# Patient Record
Sex: MALE | Race: OTHER | Employment: FULL TIME | URBAN - METROPOLITAN AREA
[De-identification: names, ages, dates, MRNs, and addresses within clinical notes are randomized per-mention and may not be internally consistent; named-entity substitution may affect disease eponyms.]

---

## 2021-12-20 ENCOUNTER — HOSPITAL ENCOUNTER (OUTPATIENT)
Age: 46
Discharge: HOME | End: 2021-12-20
Attending: EMERGENCY MEDICINE
Payer: COMMERCIAL

## 2021-12-20 VITALS
DIASTOLIC BLOOD PRESSURE: 70 MMHG | HEART RATE: 58 BPM | SYSTOLIC BLOOD PRESSURE: 116 MMHG | TEMPERATURE: 97 F | OXYGEN SATURATION: 97 % | RESPIRATION RATE: 16 BRPM

## 2021-12-20 DIAGNOSIS — Z20.822 ENCOUNTER FOR SCREENING LABORATORY TESTING FOR COVID-19 VIRUS IN ASYMPTOMATIC PATIENT: Primary | ICD-10-CM

## 2021-12-20 PROCEDURE — 99202 OFFICE O/P NEW SF 15 MIN: CPT

## 2021-12-20 NOTE — ED PROVIDER NOTES
Patient Seen in: [facility]      History   Patient presents with:  Testing    Stated Complaint: Covid exposure, headache    Subjective:   HPI    The patient is a 54-year-old male with no significant past medical history who presents now for C focal swelling or tenderness  Skin: No pallor, no redness or warmth to the touch      ED Course     Labs Reviewed   RAPID SARS-COV-2 BY PCR - Normal          Pulse ox is 97% on room air, normal     Patient's negative Covid was discussed with the patient an

## 2021-12-28 ENCOUNTER — HOSPITAL ENCOUNTER (OUTPATIENT)
Age: 46
Discharge: HOME | End: 2021-12-28
Payer: COMMERCIAL

## 2021-12-28 VITALS
OXYGEN SATURATION: 94 % | TEMPERATURE: 100 F | SYSTOLIC BLOOD PRESSURE: 125 MMHG | HEART RATE: 80 BPM | RESPIRATION RATE: 22 BRPM | DIASTOLIC BLOOD PRESSURE: 80 MMHG

## 2021-12-28 DIAGNOSIS — J02.0 STREPTOCOCCAL SORE THROAT: Primary | ICD-10-CM

## 2021-12-28 LAB — S PYO AG THROAT QL: POSITIVE

## 2021-12-28 PROCEDURE — 87880 STREP A ASSAY W/OPTIC: CPT

## 2021-12-28 PROCEDURE — 99213 OFFICE O/P EST LOW 20 MIN: CPT

## 2021-12-28 RX ORDER — IBUPROFEN 600 MG/1
600 TABLET ORAL EVERY 8 HOURS PRN
Qty: 30 TABLET | Refills: 0 | Status: SHIPPED | OUTPATIENT
Start: 2021-12-28 | End: 2022-01-04

## 2021-12-28 RX ORDER — FLUTICASONE PROPIONATE 50 MCG
2 SPRAY, SUSPENSION (ML) NASAL DAILY
Qty: 16 G | Refills: 0 | Status: SHIPPED | OUTPATIENT
Start: 2021-12-28 | End: 2022-01-27

## 2021-12-28 RX ORDER — AMOXICILLIN 500 MG/1
500 TABLET, FILM COATED ORAL 3 TIMES DAILY
Qty: 30 TABLET | Refills: 0 | Status: SHIPPED | OUTPATIENT
Start: 2021-12-28 | End: 2022-01-07

## 2021-12-28 NOTE — ED PROVIDER NOTES
Patient Seen in: [facility]      History   Patient presents with:  Cough/URI  Fever    Stated Complaint: cough,fever [id number]    Subjective:   HPI    52 yo male with abd pain and sore throat, neck supple, tolerating secretions, normal phona respiratory distress. Breath sounds: No wheezing. Abdominal:      Palpations: Abdomen is soft. Tenderness: There is no abdominal tenderness. There is no right CVA tenderness, left CVA tenderness, guarding or rebound.    Musculoskeletal: verbalized understanding of the discharge instructions and plan, also including, if needed, prescription drug management and or OTC drug management.      I spent a total of 11 minutes during chart review, obtaining history, performing a physical exam, bedsi 8 (eight) hours as needed for Pain or Fever.   Qty: 30 tablet Refills: 0

## 2021-12-31 LAB — SARS-COV-2 RNA RESP QL NAA+PROBE: DETECTED

## 2022-01-03 ENCOUNTER — HOSPITAL ENCOUNTER (OUTPATIENT)
Age: 47
Discharge: STILL ADMITTED OR ADMINISTRATIVE | End: 2022-01-03

## 2022-01-03 ENCOUNTER — HOSPITAL ENCOUNTER (OUTPATIENT)
Age: 47
Discharge: HOME | End: 2022-01-03
Payer: COMMERCIAL

## 2022-01-03 NOTE — ED INITIAL ASSESSMENT (HPI)
Patient presents for asymptomatic covid testing. Denies any complaints.  States needs test for return to work

## 2022-01-04 LAB — SARS-COV-2 RNA RESP QL NAA+PROBE: NOT DETECTED

## 2023-04-24 ENCOUNTER — HOSPITAL ENCOUNTER (OUTPATIENT)
Age: 48
Discharge: HOME | End: 2023-04-24
Payer: COMMERCIAL

## 2023-04-24 VITALS
TEMPERATURE: 98 F | OXYGEN SATURATION: 96 % | HEART RATE: 69 BPM | RESPIRATION RATE: 16 BRPM | SYSTOLIC BLOOD PRESSURE: 120 MMHG | DIASTOLIC BLOOD PRESSURE: 76 MMHG

## 2023-04-24 DIAGNOSIS — H04.202 EYE TEARING, LEFT: ICD-10-CM

## 2023-04-24 DIAGNOSIS — S05.02XA ABRASION OF LEFT CORNEA, INITIAL ENCOUNTER: Primary | ICD-10-CM

## 2023-04-24 PROCEDURE — 99213 OFFICE O/P EST LOW 20 MIN: CPT

## 2023-04-24 PROCEDURE — 99214 OFFICE O/P EST MOD 30 MIN: CPT

## 2023-04-24 RX ORDER — TETRACAINE HYDROCHLORIDE 5 MG/ML
2 SOLUTION OPHTHALMIC ONCE
Status: COMPLETED | OUTPATIENT
Start: 2023-04-24 | End: 2023-04-24

## 2023-04-24 RX ORDER — PURIFIED WATER 986 MG/ML
10 SOLUTION OPHTHALMIC ONCE
Status: COMPLETED | OUTPATIENT
Start: 2023-04-24 | End: 2023-04-24

## 2023-04-24 RX ORDER — OFLOXACIN 3 MG/ML
2 SOLUTION/ DROPS OPHTHALMIC 4 TIMES DAILY
Qty: 10 ML | Refills: 0 | Status: SHIPPED | OUTPATIENT
Start: 2023-04-24 | End: 2023-04-29

## 2023-04-24 NOTE — DISCHARGE INSTRUCTIONS
Follow-up with ophthalmologist if he sees you today then do not  the antibiotic until you are seen by him if he is not able to see you today  the antibiotic and started today. Good handwashing before putting the eyedrops and if you develop entire outer eye swelling redness or warmth pus or drainage from the eye feel like you are losing your vision or having vision changing severe headache nausea vomiting or fever go to the nearest emergency department.

## 2023-04-24 NOTE — ED INITIAL ASSESSMENT (HPI)
Pt states he works in a [employer] and hand fluid from the fish bag splash in his left eye a few months ago. Since then, the patients states his left eye has been watering and began burning a few days ago. Denies visual changes.

## (undated) NOTE — LETTER
Date & Time: 12/28/2021, 3:48 PM  Patient: Evaristo Quinonez  Encounter Provider(s):    MIR Mary       To Whom It May Concern:    Evaristo Quinonez was seen and treated in our department on 12/28/2021.  Covid pending, please excuse Justyn until [place]

## (undated) NOTE — LETTER
Date & Time: 12/20/2021, 2:10 PM  Patient: Eulalio Simpson  Encounter Provider(s):    Unique Dao MD       To Whom It May Concern:    Eulalio Simpson was seen and treated in our department on 12/20/2021. He should not return to work until 12/21/2021.